# Patient Record
Sex: MALE | Race: WHITE | NOT HISPANIC OR LATINO | Employment: OTHER | URBAN - METROPOLITAN AREA
[De-identification: names, ages, dates, MRNs, and addresses within clinical notes are randomized per-mention and may not be internally consistent; named-entity substitution may affect disease eponyms.]

---

## 2022-02-24 ENCOUNTER — EMERGENCY VISIT (OUTPATIENT)
Dept: URBAN - METROPOLITAN AREA CLINIC 47 | Facility: CLINIC | Age: 62
End: 2022-02-24

## 2022-02-24 ENCOUNTER — CONSULTATION/EVALUATION (OUTPATIENT)
Dept: URBAN - METROPOLITAN AREA CLINIC 35 | Facility: CLINIC | Age: 62
End: 2022-02-24

## 2022-02-24 DIAGNOSIS — H25.12: ICD-10-CM

## 2022-02-24 DIAGNOSIS — H33.42: ICD-10-CM

## 2022-02-24 DIAGNOSIS — H33.002: ICD-10-CM

## 2022-02-24 PROCEDURE — 92014 COMPRE OPH EXAM EST PT 1/>: CPT

## 2022-02-24 PROCEDURE — 99215 OFFICE O/P EST HI 40 MIN: CPT

## 2022-02-24 PROCEDURE — 92250 FUNDUS PHOTOGRAPHY W/I&R: CPT

## 2022-02-24 PROCEDURE — 92134 CPTRZ OPH DX IMG PST SGM RTA: CPT

## 2022-02-24 RX ORDER — OFLOXACIN 3 MG/ML
1 SOLUTION OPHTHALMIC
Start: 2022-03-04

## 2022-02-24 RX ORDER — PREDNISOLONE ACETATE 10 MG/ML
1 SUSPENSION/ DROPS OPHTHALMIC
Start: 2022-03-04

## 2022-02-24 ASSESSMENT — VISUAL ACUITY
OU_SC: J1
OD_CC: J6
OS_SC: J12
OD_SC: J1
OS_SC: CF 6FT
OD_CC: 20/20
OD_SC: 20/400
OS_CC: 20/150
OS_CC: 20/200
OD_CC: 20/20
OS_CC: J12
OU_SC: 20/200

## 2022-02-24 ASSESSMENT — TONOMETRY
OS_IOP_MMHG: 14
OD_IOP_MMHG: 16
OD_IOP_MMHG: 16
OS_IOP_MMHG: 14

## 2022-02-24 NOTE — PATIENT DISCUSSION
Gtts called in-Start Pred QID, Ofloxacin QID day after retinal surgery.  No MC needed.  Chair brochure given to patient.  Will do H & P for retinal surgery at 1 day postop from cataract surgery on 03/03/22.

## 2022-02-24 NOTE — PATIENT DISCUSSION
Recommend cataract removal prior to PPV  to allow for more complete retinal surgery.  Discussed with patient cataract surgery is to allow for more complete retinal surgery, not improve vision.  [facility] consult 02/25/22 @ 11:00 in [place], Cataract surgery Tuesday 03/01/22, PO with TPB 03/02/22 @ 8:30 in [facility] [place] [facility], PPV with TPB 03/03/22.

## 2022-02-24 NOTE — PATIENT DISCUSSION
Recommend cataract surgery prior to retinal surgery to allow for more complete PPV.  [address] consult 02/25/22 @ 11:00 in [place], Cataract surgery Tuesday 03/01/22, PO with TPB 03/02/22 @ 8:30 in [facility], PPV with TPB 03/03/22, PO 03/04/22 @ 7:45.

## 2022-02-25 ENCOUNTER — CONSULTATION/EVALUATION (OUTPATIENT)
Dept: URBAN - METROPOLITAN AREA CLINIC 36 | Facility: CLINIC | Age: 62
End: 2022-02-25

## 2022-02-25 DIAGNOSIS — H33.42: ICD-10-CM

## 2022-02-25 DIAGNOSIS — H25.812: ICD-10-CM

## 2022-02-25 DIAGNOSIS — H52.13: ICD-10-CM

## 2022-02-25 DIAGNOSIS — H43.12: ICD-10-CM

## 2022-02-25 PROCEDURE — 92014 COMPRE OPH EXAM EST PT 1/>: CPT

## 2022-02-25 PROCEDURE — 92136TC INTERFEROMETRY - TECHNICAL COMPONENT

## 2022-02-25 RX ORDER — MOXIFLOXACIN OPHTHALMIC 5 MG/ML: 1 SOLUTION/ DROPS OPHTHALMIC

## 2022-02-25 RX ORDER — KETOROLAC TROMETHAMINE 5 MG/ML: 1 SOLUTION OPHTHALMIC

## 2022-02-25 ASSESSMENT — VISUAL ACUITY
OS_SC: <J12
OD_SC: J1 @ 3"
OS_CC: CF 5FT EF
OD_CC: 20/25-1
OS_SC: CF 3FT EF
OD_SC: 20/100-1

## 2022-02-25 ASSESSMENT — TONOMETRY
OD_IOP_MMHG: 13
OS_IOP_MMHG: 13

## 2022-02-25 NOTE — PATIENT DISCUSSION
Recommend cataract removal prior to PPV  to allow for more complete retinal surgery.  Discussed with patient cataract surgery is to allow for more complete retinal surgery, not improve vision.  [address] consult 02/25/22 @ 11:00 in [place], Cataract surgery Tuesday 03/01/22, PO with TPB 03/02/22 @ 8:30 in [facility], PPV with TPB 03/03/22.

## 2022-02-25 NOTE — PATIENT DISCUSSION
PLAN: CE/IOL OS, goal liborio, p.o w/TB as scheduled. +Ocucoat, +Suture to the wound. Discussed this will be a 2 step procedure w/TB. Discussed no improvement in vision even after CE/IOL is done. Rec: BASIC only. No van needed. R/B/A/C discussed in great detail. No Imprimis.

## 2022-02-25 NOTE — PATIENT DISCUSSION
Discussed with patient anisometropia after cataract surgery and the need for contact lens or surgical correction in the fellow eye for binocular vision.

## 2022-03-01 ENCOUNTER — PRE-OP/H&P (OUTPATIENT)
Dept: URBAN - METROPOLITAN AREA CLINIC 39 | Facility: CLINIC | Age: 62
End: 2022-03-01

## 2022-03-01 ENCOUNTER — SURGERY/PROCEDURE (OUTPATIENT)
Dept: URBAN - METROPOLITAN AREA CLINIC 43 | Facility: CLINIC | Age: 62
End: 2022-03-01

## 2022-03-01 DIAGNOSIS — H25.812: ICD-10-CM

## 2022-03-01 DIAGNOSIS — H33.42: ICD-10-CM

## 2022-03-01 DIAGNOSIS — H43.12: ICD-10-CM

## 2022-03-01 PROCEDURE — 66984 XCAPSL CTRC RMVL W/O ECP: CPT

## 2022-03-01 PROCEDURE — 99211HP H&P OFFICE/OUTPATIENT VISIT, EST

## 2022-03-01 NOTE — PATIENT DISCUSSION
Recommend cataract removal prior to PPV  to allow for more complete retinal surgery.  Discussed with patient cataract surgery is to allow for more complete retinal surgery, not improve vision.  [address] consult 02/25/22 @ 11:00 in [place], Cataract surgery Tuesday 03/01/22, PO with TPB 03/02/22 @ 8:30 in [facility], PPV with TPB 03/03/22.

## 2022-03-01 NOTE — PATIENT DISCUSSION
Recommend cataract removal prior to PPV  to allow for more complete retinal surgery.  Discussed with patient cataract surgery is to allow for more complete retinal surgery, not improve vision.  [address] consult 02/25/22 @ 11:00 in [place], Cataract surgery Tuesday 03/01/22, PO with TPB 03/02/22 @ 8:30 in [facility], PPV with TPB 03/03/22.

## 2022-03-02 ENCOUNTER — ESTABLISHED PATIENT (OUTPATIENT)
Dept: URBAN - METROPOLITAN AREA CLINIC 43 | Facility: CLINIC | Age: 62
End: 2022-03-02

## 2022-03-02 DIAGNOSIS — H43.12: ICD-10-CM

## 2022-03-02 DIAGNOSIS — H33.42: ICD-10-CM

## 2022-03-02 DIAGNOSIS — Z96.1: ICD-10-CM

## 2022-03-02 PROCEDURE — 99214 OFFICE O/P EST MOD 30 MIN: CPT

## 2022-03-02 RX ORDER — DUREZOL 0.5 MG/ML
1 EMULSION OPHTHALMIC
Start: 2022-03-02

## 2022-03-02 ASSESSMENT — TONOMETRY: OS_IOP_MMHG: 11

## 2022-03-02 ASSESSMENT — VISUAL ACUITY
OD_CC: 20/20
OS_SC: 20/400

## 2022-03-02 NOTE — PATIENT DISCUSSION
Stop Pred, Start Durezol QID, Continue Ofloxacin QID, Ketorolac QID.  No MC needed.  Chair brochure given to patient.

## 2022-03-03 ENCOUNTER — SURGERY/PROCEDURE (OUTPATIENT)
Dept: URBAN - METROPOLITAN AREA CLINIC 43 | Facility: CLINIC | Age: 62
End: 2022-03-03

## 2022-03-03 DIAGNOSIS — H33.42: ICD-10-CM

## 2022-03-03 PROCEDURE — 67113 REPAIR RETINAL DETACH CPLX: CPT

## 2022-03-03 NOTE — PATIENT DISCUSSION
Post Op Week 3 : No Eye Pain, Doing well, retina attached, good IOP with no signs of endophthalmitis.

## 2022-03-03 NOTE — PATIENT DISCUSSION
Post-op instructions given. Discussed drops -  Continue Durezol BID positioning Sleep on Right SIde- No flat on Back , no strenuous activity (walking OK ) No Heavy Lifting over 15 pounds  No running and eye protection. Call immediately if eye pain or loss of vision.

## 2022-03-03 NOTE — PATIENT DISCUSSION
Avoid exposure of the eye to non-sterile fluid such as shower/bath water. No Rubbing or Touching eyes, No swimming.

## 2022-03-04 ENCOUNTER — POST-OP (OUTPATIENT)
Dept: URBAN - METROPOLITAN AREA CLINIC 43 | Facility: CLINIC | Age: 62
End: 2022-03-04

## 2022-03-04 DIAGNOSIS — H33.42: ICD-10-CM

## 2022-03-04 DIAGNOSIS — H43.12: ICD-10-CM

## 2022-03-04 PROCEDURE — 99024 POSTOP FOLLOW-UP VISIT: CPT

## 2022-03-04 RX ORDER — TIMOLOL MALEATE 6.8 MG/ML
1 SOLUTION OPHTHALMIC TWICE A DAY
Start: 2022-03-04

## 2022-03-04 RX ORDER — ERYTHROMYCIN 5 MG/G
OINTMENT OPHTHALMIC TWICE A DAY
Start: 2022-03-04

## 2022-03-04 ASSESSMENT — TONOMETRY: OS_IOP_MMHG: 10

## 2022-03-04 ASSESSMENT — VISUAL ACUITY: OS_SC: CF 1FT

## 2022-03-04 NOTE — PATIENT DISCUSSION
Avoid exposure of the eye to non-sterile fluid such as shower/bath water. No Rubbing or Touching eyes.

## 2022-03-04 NOTE — PATIENT DISCUSSION
Post-op instructions given. Discussed drops - Start Durezol BID, Moxifloxacin QID, Erythromycin Ointment BID, positioning Head down 50-55 minutes every hour for 1 week, no strenuous activity and eye protection. Call immediately if eye pain or loss of vision.

## 2022-03-07 ENCOUNTER — POST-OP (OUTPATIENT)
Dept: URBAN - METROPOLITAN AREA CLINIC 43 | Facility: CLINIC | Age: 62
End: 2022-03-07

## 2022-03-07 DIAGNOSIS — H33.42: ICD-10-CM

## 2022-03-07 DIAGNOSIS — H40.052: ICD-10-CM

## 2022-03-07 DIAGNOSIS — H18.892: ICD-10-CM

## 2022-03-07 DIAGNOSIS — Z96.1: ICD-10-CM

## 2022-03-07 PROCEDURE — 99024 POSTOP FOLLOW-UP VISIT: CPT

## 2022-03-07 ASSESSMENT — VISUAL ACUITY: OS_SC: CF 1FT

## 2022-03-07 ASSESSMENT — TONOMETRY: OS_IOP_MMHG: 06

## 2022-03-07 NOTE — PATIENT DISCUSSION
Post-op instructions given. Discussed drops -  Continue Durezol BID, Moxifloxacin QID, Erythromycin Ointment BID, positioning Head down 50-55 minutes every hour until, no strenuous activity and eye protection. Call immediately if eye pain or loss of vision.

## 2022-03-08 ENCOUNTER — POST-OP (OUTPATIENT)
Dept: URBAN - METROPOLITAN AREA CLINIC 36 | Facility: CLINIC | Age: 62
End: 2022-03-08

## 2022-03-08 DIAGNOSIS — H40.052: ICD-10-CM

## 2022-03-08 DIAGNOSIS — H18.892: ICD-10-CM

## 2022-03-08 DIAGNOSIS — H33.42: ICD-10-CM

## 2022-03-08 DIAGNOSIS — Z96.1: ICD-10-CM

## 2022-03-08 PROCEDURE — 99024 POSTOP FOLLOW-UP VISIT: CPT

## 2022-03-08 RX ORDER — TIMOLOL MALEATE 6.8 MG/ML
1 SOLUTION OPHTHALMIC TWICE A DAY
End: 2022-03-07

## 2022-03-08 ASSESSMENT — VISUAL ACUITY: OS_SC: CF 2FT

## 2022-03-08 NOTE — PATIENT DISCUSSION
Post-op instructions given. Discussed drops -  Continue Durezol BID, Moxifloxacin QID, D/C Erythromycin Ointment , positioning Head down 50-55 minutes every hour until Friday 3/11/22 , no strenuous activity and eye protection. Call immediately if eye pain or loss of vision.

## 2022-03-09 ENCOUNTER — POST-OP (OUTPATIENT)
Dept: URBAN - METROPOLITAN AREA CLINIC 43 | Facility: CLINIC | Age: 62
End: 2022-03-09

## 2022-03-09 DIAGNOSIS — H18.892: ICD-10-CM

## 2022-03-09 DIAGNOSIS — H33.42: ICD-10-CM

## 2022-03-09 PROCEDURE — 99024 POSTOP FOLLOW-UP VISIT: CPT

## 2022-03-09 ASSESSMENT — VISUAL ACUITY: OS_SC: CF 3FT

## 2022-03-09 NOTE — PATIENT DISCUSSION
Post-op instructions given. Discussed drops -  Continue Durezol BID, Moxifloxacin QID ( Change to Besivance if gtt runs out - QID sample given to pt)  , positioning Head down 50-55 minutes every hour until Friday 3/11/22 , no strenuous activity and eye protection. Call immediately if eye pain or loss of vision.

## 2022-03-11 ENCOUNTER — POST-OP (OUTPATIENT)
Dept: URBAN - METROPOLITAN AREA CLINIC 43 | Facility: CLINIC | Age: 62
End: 2022-03-11

## 2022-03-11 DIAGNOSIS — H18.892: ICD-10-CM

## 2022-03-11 DIAGNOSIS — H40.052: ICD-10-CM

## 2022-03-11 DIAGNOSIS — H33.42: ICD-10-CM

## 2022-03-11 PROCEDURE — 99024 POSTOP FOLLOW-UP VISIT: CPT

## 2022-03-11 RX ORDER — HYDROXYZINE HYDROCHLORIDE 25 MG/1: 1 TABLET, FILM COATED ORAL

## 2022-03-11 ASSESSMENT — TONOMETRY: OS_IOP_MMHG: 17

## 2022-03-11 NOTE — PATIENT DISCUSSION
Post-op instructions given. Discussed drops -  Continue Durezol BID, & Moxifloxacin QID - until runs out then start Besivance QID, positioning Sleep on Right SIde- No flat on Back , no strenuous activity (walking OK ) No running and eye protection. Call immediately if eye pain or loss of vision.

## 2022-03-11 NOTE — PATIENT DISCUSSION
Post Op Week 1 : No Eye Pain, Doing well, retina attached, good IOP with no signs of endophthalmitis.

## 2022-03-16 ENCOUNTER — POST-OP (OUTPATIENT)
Dept: URBAN - METROPOLITAN AREA CLINIC 43 | Facility: CLINIC | Age: 62
End: 2022-03-16

## 2022-03-16 DIAGNOSIS — H33.42: ICD-10-CM

## 2022-03-16 DIAGNOSIS — Z96.1: ICD-10-CM

## 2022-03-16 DIAGNOSIS — H40.052: ICD-10-CM

## 2022-03-16 DIAGNOSIS — H18.892: ICD-10-CM

## 2022-03-16 PROCEDURE — 99024 POSTOP FOLLOW-UP VISIT: CPT

## 2022-03-16 ASSESSMENT — TONOMETRY: OS_IOP_MMHG: 15

## 2022-03-16 ASSESSMENT — VISUAL ACUITY: OS_SC: CF 3FT

## 2022-03-16 NOTE — PATIENT DISCUSSION
Post Op Week 2 : No Eye Pain, Doing well, retina attached, good IOP with no signs of endophthalmitis.

## 2022-03-16 NOTE — PATIENT DISCUSSION
Post-op instructions given. Discussed drops -  Continue Durezol BID D/C Moxifloxacin, positioning Sleep on Right SIde- No flat on Back , no strenuous activity (walking OK ) No Heavy Lifting over 15 pounds  No running and eye protection. Call immediately if eye pain or loss of vision.

## 2022-03-25 ENCOUNTER — POST-OP (OUTPATIENT)
Dept: URBAN - METROPOLITAN AREA CLINIC 43 | Facility: CLINIC | Age: 62
End: 2022-03-25

## 2022-03-25 DIAGNOSIS — H40.052: ICD-10-CM

## 2022-03-25 DIAGNOSIS — Z96.1: ICD-10-CM

## 2022-03-25 DIAGNOSIS — H18.892: ICD-10-CM

## 2022-03-25 DIAGNOSIS — H33.42: ICD-10-CM

## 2022-03-25 DIAGNOSIS — H35.30: ICD-10-CM

## 2022-03-25 PROCEDURE — 99024 POSTOP FOLLOW-UP VISIT: CPT

## 2022-03-25 PROCEDURE — 92250 FUNDUS PHOTOGRAPHY W/I&R: CPT

## 2022-03-25 ASSESSMENT — TONOMETRY: OS_IOP_MMHG: 10

## 2022-03-25 ASSESSMENT — VISUAL ACUITY: OS_SC: CF 2FT

## 2022-04-01 ENCOUNTER — POST-OP (OUTPATIENT)
Dept: URBAN - METROPOLITAN AREA CLINIC 43 | Facility: CLINIC | Age: 62
End: 2022-04-01

## 2022-04-01 DIAGNOSIS — H33.322: ICD-10-CM

## 2022-04-01 DIAGNOSIS — H33.42: ICD-10-CM

## 2022-04-01 DIAGNOSIS — H35.30: ICD-10-CM

## 2022-04-01 DIAGNOSIS — H40.052: ICD-10-CM

## 2022-04-01 PROCEDURE — 99024 POSTOP FOLLOW-UP VISIT: CPT

## 2022-04-01 PROCEDURE — 92250 FUNDUS PHOTOGRAPHY W/I&R: CPT

## 2022-04-01 PROCEDURE — 67145 PROPH RTA DTCHMNT PC: CPT

## 2022-04-01 PROCEDURE — 92134 CPTRZ OPH DX IMG PST SGM RTA: CPT

## 2022-04-01 RX ORDER — PREDNISOLONE ACETATE 10 MG/ML: 1 SUSPENSION/ DROPS OPHTHALMIC

## 2022-04-01 ASSESSMENT — TONOMETRY: OS_IOP_MMHG: 12

## 2022-04-01 ASSESSMENT — VISUAL ACUITY
OS_CC: CF 2FT
OD_CC: 20/25

## 2022-04-01 NOTE — PATIENT DISCUSSION
Post-op instructions given. Discussed drops -  Continue Durezol BID until gone then use pf qid. positioning Sleep on Right SIde- No flat on Back , no strenuous activity (walking OK ) No Heavy Lifting over 15 pounds  No running and eye protection. Call immediately if eye pain or loss of vision.

## 2022-04-15 ENCOUNTER — POST-OP (OUTPATIENT)
Dept: URBAN - METROPOLITAN AREA CLINIC 43 | Facility: CLINIC | Age: 62
End: 2022-04-15

## 2022-04-15 DIAGNOSIS — H33.42: ICD-10-CM

## 2022-04-15 DIAGNOSIS — H35.30: ICD-10-CM

## 2022-04-15 DIAGNOSIS — H40.052: ICD-10-CM

## 2022-04-15 DIAGNOSIS — H33.322: ICD-10-CM

## 2022-04-15 PROCEDURE — 99024 POSTOP FOLLOW-UP VISIT: CPT

## 2022-04-15 PROCEDURE — 92250 FUNDUS PHOTOGRAPHY W/I&R: CPT

## 2022-04-15 ASSESSMENT — VISUAL ACUITY
OS_SC: 20/200
OS_PH: 20/100-1

## 2022-04-15 ASSESSMENT — TONOMETRY: OS_IOP_MMHG: 14

## 2022-04-15 NOTE — PATIENT DISCUSSION
Post-op instructions given. Discussed drops -  Continue Durezol BID until gone then start Pred QID. Positioning Sleep on Right Side- NOT flat on Back , no strenuous activity (walking OK ) No Heavy Lifting over 15 pounds  No running and eye protection. Call immediately if eye pain or loss of vision.

## 2022-04-15 NOTE — PATIENT DISCUSSION
Advised to call immediately if any worsening distortion or blurring is noted. Attending Attestation (For Attendings USE Only)...

## 2022-04-15 NOTE — PATIENT DISCUSSION
Post Op Week 6 : No Eye Pain, Doing well, retina attached, good IOP with no signs of endophthalmitis.

## 2022-04-29 ENCOUNTER — POST-OP (OUTPATIENT)
Dept: URBAN - METROPOLITAN AREA CLINIC 43 | Facility: CLINIC | Age: 62
End: 2022-04-29

## 2022-04-29 DIAGNOSIS — H33.42: ICD-10-CM

## 2022-04-29 DIAGNOSIS — H33.322: ICD-10-CM

## 2022-04-29 DIAGNOSIS — H40.052: ICD-10-CM

## 2022-04-29 DIAGNOSIS — H18.892: ICD-10-CM

## 2022-04-29 PROCEDURE — 92250 FUNDUS PHOTOGRAPHY W/I&R: CPT

## 2022-04-29 PROCEDURE — 99024 POSTOP FOLLOW-UP VISIT: CPT

## 2022-04-29 RX ORDER — DORZOLAMIDE HYDROCHLORIDE AND TIMOLOL MALEATE 20; 5 MG/ML; MG/ML: 1 SOLUTION/ DROPS OPHTHALMIC TWICE A DAY

## 2022-04-29 ASSESSMENT — TONOMETRY
OD_IOP_MMHG: 18
OS_IOP_MMHG: 17

## 2022-04-29 ASSESSMENT — VISUAL ACUITY
OS_SC: 20/200
OD_CC: 20/30
OS_PH: 20/60

## 2022-04-29 NOTE — PATIENT DISCUSSION
Post-op instructions given. Discussed drops -  Start tapering Pred-TID for 2 weeks, BID for 2 weeks TID for 2 weeks then stop, When traveling take Diamox 500mg BID by mouth(2-250mg), Cosopt BID(sample given). Positioning Sleep on Right Side- NOT flat on Back , no strenuous activity, No Heavy Lifting over 15 pounds  No running and eye protection. Call immediately if eye pain or loss of vision.

## 2022-04-29 NOTE — PATIENT DISCUSSION
Post Op Month 2 : No Eye Pain, Doing well, retina attached, good IOP with no signs of endophthalmitis.

## 2022-10-12 ENCOUNTER — ESTABLISHED PATIENT (OUTPATIENT)
Dept: URBAN - METROPOLITAN AREA CLINIC 43 | Facility: CLINIC | Age: 62
End: 2022-10-12

## 2022-10-12 DIAGNOSIS — H33.322: ICD-10-CM

## 2022-10-12 DIAGNOSIS — H40.052: ICD-10-CM

## 2022-10-12 DIAGNOSIS — H18.892: ICD-10-CM

## 2022-10-12 DIAGNOSIS — H33.42: ICD-10-CM

## 2022-10-12 PROCEDURE — 92250 FUNDUS PHOTOGRAPHY W/I&R: CPT

## 2022-10-12 PROCEDURE — 99214 OFFICE O/P EST MOD 30 MIN: CPT

## 2022-10-12 ASSESSMENT — TONOMETRY
OD_IOP_MMHG: 16
OS_IOP_MMHG: 13

## 2022-10-12 ASSESSMENT — VISUAL ACUITY
OD_CC: 20/20
OS_CC: 20/30

## 2025-06-18 NOTE — PATIENT DISCUSSION
Advised to call immediately if any worsening distortion or blurring is noted. Reviewed chart, lives with wife, uses walker. Undergoing chemo for Cholangiocarcinoma. Plan to DC home with wife and [facility] RN. CM following.